# Patient Record
Sex: MALE | Race: WHITE | Employment: OTHER | URBAN - METROPOLITAN AREA
[De-identification: names, ages, dates, MRNs, and addresses within clinical notes are randomized per-mention and may not be internally consistent; named-entity substitution may affect disease eponyms.]

---

## 2017-01-23 ENCOUNTER — OFFICE VISIT (OUTPATIENT)
Dept: CARDIOLOGY CLINIC | Age: 57
End: 2017-01-23

## 2017-01-23 VITALS
OXYGEN SATURATION: 95 % | HEART RATE: 98 BPM | WEIGHT: 282 LBS | DIASTOLIC BLOOD PRESSURE: 86 MMHG | BODY MASS INDEX: 42.74 KG/M2 | SYSTOLIC BLOOD PRESSURE: 130 MMHG | HEIGHT: 68 IN

## 2017-01-23 DIAGNOSIS — R06.02 SOB (SHORTNESS OF BREATH): ICD-10-CM

## 2017-01-23 DIAGNOSIS — Z01.810 PREOP CARDIOVASCULAR EXAM: ICD-10-CM

## 2017-01-23 DIAGNOSIS — E78.2 MIXED HYPERLIPIDEMIA: ICD-10-CM

## 2017-01-23 DIAGNOSIS — I25.10 CORONARY ARTERY DISEASE INVOLVING NATIVE CORONARY ARTERY OF NATIVE HEART WITHOUT ANGINA PECTORIS: Primary | ICD-10-CM

## 2017-01-23 DIAGNOSIS — E78.5 HYPERLIPIDEMIA, UNSPECIFIED HYPERLIPIDEMIA TYPE: ICD-10-CM

## 2017-01-23 PROCEDURE — 99214 OFFICE O/P EST MOD 30 MIN: CPT | Performed by: INTERNAL MEDICINE

## 2017-01-23 RX ORDER — PREDNISONE 10 MG/1
TABLET ORAL
COMMUNITY
Start: 2016-10-26 | End: 2017-09-06

## 2017-01-23 RX ORDER — ASPIRIN 81 MG/1
81 TABLET ORAL DAILY
Qty: 30 TABLET | Refills: 11 | Status: SHIPPED | OUTPATIENT
Start: 2017-01-23

## 2017-01-23 RX ORDER — LISINOPRIL 5 MG/1
TABLET ORAL
Qty: 30 TABLET | Refills: 11 | Status: SHIPPED | OUTPATIENT
Start: 2017-01-23

## 2017-01-23 RX ORDER — ROSUVASTATIN CALCIUM 20 MG/1
TABLET, COATED ORAL
Qty: 30 TABLET | Refills: 11 | Status: SHIPPED | OUTPATIENT
Start: 2017-01-23

## 2017-01-23 RX ORDER — POTASSIUM CHLORIDE 750 MG/1
10 TABLET, EXTENDED RELEASE ORAL DAILY
Qty: 30 TABLET | Refills: 11 | Status: SHIPPED | OUTPATIENT
Start: 2017-01-23 | End: 2017-09-06

## 2017-01-23 RX ORDER — DULOXETIN HYDROCHLORIDE 30 MG/1
30 CAPSULE, DELAYED RELEASE ORAL
COMMUNITY
Start: 2016-12-22

## 2017-09-06 PROBLEM — I50.30 DIASTOLIC CHF (HCC): Status: ACTIVE | Noted: 2017-09-06

## 2017-10-27 ENCOUNTER — OFFICE VISIT (OUTPATIENT)
Dept: ORTHOPEDIC SURGERY | Age: 57
End: 2017-10-27

## 2017-10-27 VITALS — BODY MASS INDEX: 36.37 KG/M2 | HEIGHT: 68 IN | WEIGHT: 240 LBS

## 2017-10-27 DIAGNOSIS — M25.551 RIGHT HIP PAIN: ICD-10-CM

## 2017-10-27 DIAGNOSIS — M16.11 PRIMARY OSTEOARTHRITIS OF RIGHT HIP: Primary | ICD-10-CM

## 2017-10-27 PROCEDURE — G8417 CALC BMI ABV UP PARAM F/U: HCPCS | Performed by: PHYSICIAN ASSISTANT

## 2017-10-27 PROCEDURE — G8598 ASA/ANTIPLAT THER USED: HCPCS | Performed by: PHYSICIAN ASSISTANT

## 2017-10-27 PROCEDURE — 1036F TOBACCO NON-USER: CPT | Performed by: PHYSICIAN ASSISTANT

## 2017-10-27 PROCEDURE — G8484 FLU IMMUNIZE NO ADMIN: HCPCS | Performed by: PHYSICIAN ASSISTANT

## 2017-10-27 PROCEDURE — 3017F COLORECTAL CA SCREEN DOC REV: CPT | Performed by: PHYSICIAN ASSISTANT

## 2017-10-27 PROCEDURE — 99213 OFFICE O/P EST LOW 20 MIN: CPT | Performed by: PHYSICIAN ASSISTANT

## 2017-10-27 PROCEDURE — G8427 DOCREV CUR MEDS BY ELIG CLIN: HCPCS | Performed by: PHYSICIAN ASSISTANT

## 2017-10-27 NOTE — PROGRESS NOTES
able to ambulate with the assistance of wheelchair and walker for No steps  steps/feet. Walking distance less than 1 block    Patient needs assistance with activities of daily living bathing and cooking. Extended Care / [facility]: Yes     Safety Issues: Lives alone, Home safety issues, Difficulty with daily activities and Risk of falls  Patient is at risk for falls/fall history: Yes    General Exam:   Constitutional: Patient is adequately groomed with no evidence of malnutrition  DTRs: Deep tendon reflexes are intact  Mental Status: The patient is oriented to time, place and person. The patient's mood and affect are appropriate. Lymphatic: The lymphatic examination bilaterally reveals all areas to be without enlargement or induration. Vascular: Examination reveals no swelling or calf tenderness. Peripheral pulses are palpable and 2+. Neurological: The patient has good coordination. There is no weakness or sensory deficit. BMI: 36.49    Right and left Hip Examination:    Inspection:  No erythema or signs of infection. There are no cutaneous lesions. Palpation:  Moderate tenderness to palpation over the greater trochanteric region. Range of Motion:  Extremely limited range of motion of the right hip no external rotation. He can flex to about 20°. Internal rotation causes marked reproducible groin pain. Strength:  4/5 strength limited by pain. Special Tests: There is a positive log roll maneuver. Negative Mandeep's test.  Negative Homans test.    Skin: There are no rashes, ulcerations or lesions. Gait: Antalgic favoring the right side. He is brought in via wheelchair and uses a walker to ambulate. Reflex 2+ patellar    Additional Comments:       Additional Examinations:         Right Lower Extremity: Examination of the right lower extremity does not show any tenderness, deformity or injury. Range of motion is unremarkable. There is no gross instability.   There

## 2017-10-27 NOTE — LETTER
CONSENT TO SURGICAL OR MEDICAL PROCEDURE    PATIENTS NAMES: Tony Omalley 1960  60 y.o. [phone] (home)   DATE/TIME: 10/27/2017 12:53 PM    1) I consent that Dr. Mike Pedraza perform one or more surgical and or medical procedures on the above named patient at: [address] [place] [address]/[facility]/[place] [address] to treat the condition(s) which appear indicated by the diagnostic studies already preformed. I have been told in general terms the nature and purpose of the procedure(s) and what the procedure(s) is/are expected to accomplish. They procedure(s) are as follows:   RIGHT LATERAL TOTAL HIP REPLACEMENT WITH C-ARM AND CELL SAVER    2) It has been explained to me by the informing physician that during the course of any surgical or medical procedure unforeseen condition(s) may be revealed that necessitate an extension of the original procedure(s) or a different procedure(s) than set forth in Paragraph 1. I therefore consent that the above named physician perform such additional or different procedure(s) as are necessary or desirable in the exercise of his professional judgement. 3) I have been made aware by the informing physician of certain reasonably known risks that are associated with the procedure(s) set forth in Paragraph 1.  I understand the reasonably known risk to be: Including but not limited to: CVA, infection, M.I., Phlebitis, Cardiac Arrest and Pulmonary Embolism, Loss of Circulation, Nerve Injury, Delayed Healing, Recurrence, Loss of extremity/digit, R.S.D., Screw breakage, Arthritis, Pain, Swelling, Stiffness, Failure of Prothesis, Fracture, Leg length discrepancy, Wound complication/non-healing, need for further surgery and persistent pain.   4) I have also been informed by the informing physician that there are other risks, from both known and unknown causes, that are attendant to the A) Patient is a minor ______________ years of age. B) Patient is unable to sign because_________________________________________________    The undersigned represents that he or she is duly authorized to execute to this consent for and on the behalf of the above named patient.    ________________________________             __________________________________________  Witness                                                                         Parent/Spouse/Guardian/Other:_________________    Medical Record#  Insurance  Smartphone:  Yes   Or   No  Email:                   You have signed a consent to have a total joint replacement surgery performed. Before you can proceed with surgery the following things must be done. Please use this form as a checklist.      _____   Please schedule your Physical Therapy functional evaluation. This evaluation must be done at the [facility] and [facility] locations. _____   Please take your lab orders and get your blood work done at [facility] or [facility].      _____  Bobby Quintanilla will need to go to [facility] to complete registration and the medical questionnaire prior to your physical therapy evaluation. Once you have completed both the labs and the evaluation please call Marck Mendoza @ [phone] to let her know. Once verification of the PT Evaluation and completed labs has been determined you will be called and set up for surgery. This may take 1-2 days to check results and return your phone call.

## 2017-10-27 NOTE — LETTER
Attention    These are medical screening labs, not pre-admission surgery labs. Via Ryan Jain M.D.  [phone]  [facility], [address] [place] [address]    Date:  10/27/2017    Name: Tank Sosa    : 1960    Please take this form with you.       THE FOLLOWING LABS NEED TO BE COMPLETED:    _x__UA  _x__URINE C/S (THIS NEEDS TO BE DONE EVEN IF THE UA IS NORMAL)  _x__CBC W/ DIFF  _x__PT/INR  _x__PTT  _x__TRANSFERRIN  _x__ALBUMIN  _x__CHEM 7  _x__HEMAGLOBIN A1-C  ____OTHER: _____________________________________________                         Diagnosis:  RIGHT HIP OSTEOARTHRITIS            RT KNEE OA M17.11  LT KNEE OA M17.12         RT HIP OA  M16.11     LT HIP OA M16.12             Z01.812  (Pre-op examination code)      10/27/2017 12:53 PM  Geena Gimenez PA-C

## 2017-10-30 ENCOUNTER — OFFICE VISIT (OUTPATIENT)
Dept: CARDIOLOGY CLINIC | Age: 57
End: 2017-10-30

## 2017-10-30 VITALS
HEIGHT: 68 IN | SYSTOLIC BLOOD PRESSURE: 98 MMHG | DIASTOLIC BLOOD PRESSURE: 64 MMHG | WEIGHT: 242 LBS | BODY MASS INDEX: 36.68 KG/M2 | HEART RATE: 98 BPM | OXYGEN SATURATION: 97 %

## 2017-10-30 DIAGNOSIS — I25.10 CORONARY ARTERY DISEASE INVOLVING NATIVE CORONARY ARTERY OF NATIVE HEART WITHOUT ANGINA PECTORIS: ICD-10-CM

## 2017-10-30 DIAGNOSIS — I10 ESSENTIAL HYPERTENSION: ICD-10-CM

## 2017-10-30 DIAGNOSIS — R53.83 FATIGUE, UNSPECIFIED TYPE: ICD-10-CM

## 2017-10-30 DIAGNOSIS — R06.02 SOB (SHORTNESS OF BREATH): ICD-10-CM

## 2017-10-30 DIAGNOSIS — I50.32 CHRONIC DIASTOLIC CONGESTIVE HEART FAILURE (HCC): Primary | ICD-10-CM

## 2017-10-30 PROCEDURE — G8417 CALC BMI ABV UP PARAM F/U: HCPCS | Performed by: NURSE PRACTITIONER

## 2017-10-30 PROCEDURE — G8598 ASA/ANTIPLAT THER USED: HCPCS | Performed by: NURSE PRACTITIONER

## 2017-10-30 PROCEDURE — G8484 FLU IMMUNIZE NO ADMIN: HCPCS | Performed by: NURSE PRACTITIONER

## 2017-10-30 PROCEDURE — 99214 OFFICE O/P EST MOD 30 MIN: CPT | Performed by: NURSE PRACTITIONER

## 2017-10-30 PROCEDURE — 1036F TOBACCO NON-USER: CPT | Performed by: NURSE PRACTITIONER

## 2017-10-30 PROCEDURE — G8427 DOCREV CUR MEDS BY ELIG CLIN: HCPCS | Performed by: NURSE PRACTITIONER

## 2017-10-30 PROCEDURE — 3017F COLORECTAL CA SCREEN DOC REV: CPT | Performed by: NURSE PRACTITIONER

## 2017-10-30 NOTE — PROGRESS NOTES
CREATININE <0.5 09/11/2017    CREATININE 0.6 09/09/2017    CREATININE 0.6 09/08/2017     BNP:   Lab Results   Component Value Date    PROBNP 133 09/06/2017       Recent Testing:  Echo 9/8/17  Summary   Technically difficult examination secondary to habitus. Left ventricular systolic function is normal with the ejection fraction   estimated at 55%. No regional wall motion abnormalities. Grade I diastolic dysfunction with normal filling pressure. There is mild concentric left ventricular hypertrophy. Mild bi-atrial enlargement. ECHO/MUGA: 9/9/2014  Difficult study, limited visualization. Left ventricle size is normal. Moderate concentric left ventricular  hypertrophy is present. Global ejection fraction appears normal and  estimated from 55 % to 60 %. Definity contrast enhancement was used but  basal and mid walls still not well see- unable to call wall motion  abnormalities due to incomplete visualization of the endocardial border. However, there does not appear to be any gross abnormalities. Diastolic  filling parameters suggests normal diastolic function. There is a septal  bounce present, possibly due to ECG conduction delay. CARDIAC CATH: 11/10/2014  Lm- ok  LAD- mid 60% (+ FFR)  LCX- luminals  RCA- luminals      Pertinent Problems:  · Chronic diastolic heart failure. NYHA class III  · CAD s/p PCI HIRAM to LAD 11/2014  · Hypertension  · COPD;mild emphysema  · Obese    Visit Diagnosis:    1. Chronic diastolic congestive heart failure ([address]    2. Coronary artery disease involving native coronary artery of native heart without angina pectoris    3. Fatigue, unspecified type    4. SOB (shortness of breath)    5. Essential hypertension          Plan:   1. Continue daily weights and track weights; if you gain 3lbs in a day or 5lbs in a week then call our office  2. Use compression socks to help with swelling  3. Check BMP in the next 1 week   4. No medication changes today  5.  Follow up with

## 2017-10-30 NOTE — LETTER
[address] [place] [address] - [address] [place] [id number]  Phone: [phone]  Fax: [phone]    Sarai Brian CNP        November 2, 2017     [place]  [address]  [facility]    Patient: Jocelynn Hart  MR Number: [id number]  YOB: 1960  Date of Visit: 10/30/2017    Dear Dr. Daniel Rizvi [facility]: Thank you for the request for consultation for Faizan Alicia to me for the evaluation of CHF. Below are the relevant portions of my assessment and plan of care. Visit Diagnosis:    1. Chronic diastolic congestive heart failure ([address]    2. Coronary artery disease involving native coronary artery of native heart without angina pectoris    3. Fatigue, unspecified type    4. SOB (shortness of breath)    5. Essential hypertension             Plan:   1. Continue daily weights and track weights; if you gain 3lbs in a day or 5lbs in a week then call our office  2. Use compression socks to help with swelling  3. Check BMP in the next 1 week   4. No medication changes today  5. Follow up with Dr. Tete Ching for cardiac clearance for hip surgery        QUALITY MEASURES  1. Tobacco Cessation Counseling: NA  2. Retake of BP if >140/90:   NA  3. Documentation to PCP/referring for new patient:  Sent to PCP at close of office visit  4. CAD patient on anti-platelet: Yes  5. CAD patient on STATIN therapy:  Yes  6. Patient with CHF and aFib on anticoagulation:  NA      I appreciate the opportunity for caring for this patient.      Joanne     If you have questions, please do not hesitate to call me. I look forward to following Antoine King along with you.     Sincerely,        Sarai Brian CNP

## 2017-10-30 NOTE — PATIENT INSTRUCTIONS
Plan:   1. Continue daily weights and track weights; if you gain 3lbs in a day or 5lbs in a week then call our office  2. Use compression socks to help with swelling  3. Keep fluid intake consistent, no more than 2 liters or 64 ounces a day. 4. No added salt; be cautious with the No-Salt Alternatives as these have a lot of potassium. 5. Check BMP in the next 1 week  6. No medication changes today  7.  Follow up with Dr. Daylin Alonso for cardiac clearance

## 2017-11-02 NOTE — COMMUNICATION BODY
Visit Diagnosis:    1. Chronic diastolic congestive heart failure ([facility] [facility]    2. Coronary artery disease involving native coronary artery of native heart without angina pectoris    3. Fatigue, unspecified type    4. SOB (shortness of breath)    5. Essential hypertension             Plan:   1. Continue daily weights and track weights; if you gain 3lbs in a day or 5lbs in a week then call our office  2. Use compression socks to help with swelling  3. Check BMP in the next 1 week   4. No medication changes today  5. Follow up with Dr. Shafer Poster for cardiac clearance for hip surgery        QUALITY MEASURES  1. Tobacco Cessation Counseling: NA  2. Retake of BP if >140/90:   NA  3. Documentation to PCP/referring for new patient:  Sent to PCP at close of office visit  4. CAD patient on anti-platelet: Yes  5. CAD patient on STATIN therapy:  Yes  6.  Patient with CHF and aFib on anticoagulation:  NA      I appreciate the opportunity for caring for this patient.      Kinza Krause

## 2017-12-12 ENCOUNTER — TELEPHONE (OUTPATIENT)
Dept: ORTHOPEDIC SURGERY | Age: 57
End: 2017-12-12